# Patient Record
Sex: MALE | Race: WHITE | NOT HISPANIC OR LATINO | Employment: FULL TIME | URBAN - METROPOLITAN AREA
[De-identification: names, ages, dates, MRNs, and addresses within clinical notes are randomized per-mention and may not be internally consistent; named-entity substitution may affect disease eponyms.]

---

## 2022-07-08 ENCOUNTER — APPOINTMENT (OUTPATIENT)
Dept: RADIOLOGY | Facility: CLINIC | Age: 67
End: 2022-07-08
Attending: EMERGENCY MEDICINE
Payer: COMMERCIAL

## 2022-07-08 ENCOUNTER — HOSPITAL ENCOUNTER (EMERGENCY)
Facility: CLINIC | Age: 67
Discharge: HOME | End: 2022-07-08
Attending: EMERGENCY MEDICINE | Admitting: EMERGENCY MEDICINE
Payer: COMMERCIAL

## 2022-07-08 VITALS
DIASTOLIC BLOOD PRESSURE: 69 MMHG | HEART RATE: 100 BPM | BODY MASS INDEX: 29.95 KG/M2 | SYSTOLIC BLOOD PRESSURE: 134 MMHG | OXYGEN SATURATION: 96 % | RESPIRATION RATE: 20 BRPM | HEIGHT: 73 IN | TEMPERATURE: 97.9 F | WEIGHT: 226 LBS

## 2022-07-08 DIAGNOSIS — R55 NEAR SYNCOPE: ICD-10-CM

## 2022-07-08 DIAGNOSIS — G89.18 POSTOPERATIVE PAIN: ICD-10-CM

## 2022-07-08 LAB
ALBUMIN SERPL-MCNC: 3.3 G/DL (ref 3.5–5)
ALP SERPL-CCNC: 54 U/L (ref 45–120)
ALT SERPL W P-5'-P-CCNC: <9 U/L (ref 0–45)
ANION GAP SERPL CALCULATED.3IONS-SCNC: 8 MMOL/L (ref 5–18)
AST SERPL W P-5'-P-CCNC: 17 U/L (ref 0–40)
ATRIAL RATE - MUSE: 91 BPM
BILIRUB SERPL-MCNC: 0.8 MG/DL (ref 0–1)
BNP SERPL-MCNC: 17 PG/ML (ref 0–62)
BUN SERPL-MCNC: 15 MG/DL (ref 8–22)
CALCIUM SERPL-MCNC: 8.7 MG/DL (ref 8.5–10.5)
CHLORIDE BLD-SCNC: 105 MMOL/L (ref 98–107)
CO2 SERPL-SCNC: 25 MMOL/L (ref 22–31)
CREAT SERPL-MCNC: 1.03 MG/DL (ref 0.7–1.3)
DIASTOLIC BLOOD PRESSURE - MUSE: 70 MMHG
ERYTHROCYTE [DISTWIDTH] IN BLOOD BY AUTOMATED COUNT: 13.2 % (ref 10–15)
GFR SERPL CREATININE-BSD FRML MDRD: 80 ML/MIN/1.73M2
GLUCOSE BLD-MCNC: 113 MG/DL (ref 70–125)
HCT VFR BLD AUTO: 32.8 % (ref 40–53)
HGB BLD-MCNC: 10.9 G/DL (ref 13.3–17.7)
INTERPRETATION ECG - MUSE: NORMAL
MCH RBC QN AUTO: 32.8 PG (ref 26.5–33)
MCHC RBC AUTO-ENTMCNC: 33.2 G/DL (ref 31.5–36.5)
MCV RBC AUTO: 99 FL (ref 78–100)
P AXIS - MUSE: 57 DEGREES
PLATELET # BLD AUTO: 202 10E3/UL (ref 150–450)
POTASSIUM BLD-SCNC: 3.6 MMOL/L (ref 3.5–5)
PR INTERVAL - MUSE: 152 MS
PROT SERPL-MCNC: 6.2 G/DL (ref 6–8)
QRS DURATION - MUSE: 64 MS
QT - MUSE: 350 MS
QTC - MUSE: 430 MS
R AXIS - MUSE: 30 DEGREES
RBC # BLD AUTO: 3.32 10E6/UL (ref 4.4–5.9)
SODIUM SERPL-SCNC: 138 MMOL/L (ref 136–145)
SYSTOLIC BLOOD PRESSURE - MUSE: 134 MMHG
T AXIS - MUSE: 55 DEGREES
TROPONIN I SERPL-MCNC: <0.01 NG/ML (ref 0–0.29)
VENTRICULAR RATE- MUSE: 91 BPM
WBC # BLD AUTO: 15.4 10E3/UL (ref 4–11)

## 2022-07-08 PROCEDURE — 71045 X-RAY EXAM CHEST 1 VIEW: CPT

## 2022-07-08 PROCEDURE — 83880 ASSAY OF NATRIURETIC PEPTIDE: CPT | Performed by: EMERGENCY MEDICINE

## 2022-07-08 PROCEDURE — 80053 COMPREHEN METABOLIC PANEL: CPT | Performed by: EMERGENCY MEDICINE

## 2022-07-08 PROCEDURE — 84484 ASSAY OF TROPONIN QUANT: CPT | Performed by: EMERGENCY MEDICINE

## 2022-07-08 PROCEDURE — 93005 ELECTROCARDIOGRAM TRACING: CPT | Performed by: EMERGENCY MEDICINE

## 2022-07-08 PROCEDURE — 99285 EMERGENCY DEPT VISIT HI MDM: CPT | Mod: 25

## 2022-07-08 PROCEDURE — 36415 COLL VENOUS BLD VENIPUNCTURE: CPT | Performed by: EMERGENCY MEDICINE

## 2022-07-08 PROCEDURE — 85027 COMPLETE CBC AUTOMATED: CPT | Performed by: EMERGENCY MEDICINE

## 2022-07-08 PROCEDURE — 250N000013 HC RX MED GY IP 250 OP 250 PS 637: Performed by: EMERGENCY MEDICINE

## 2022-07-08 RX ORDER — OXYCODONE HYDROCHLORIDE 5 MG/1
5 TABLET ORAL ONCE
Status: COMPLETED | OUTPATIENT
Start: 2022-07-08 | End: 2022-07-08

## 2022-07-08 RX ORDER — ACETAMINOPHEN 325 MG/1
650 TABLET ORAL ONCE
Status: COMPLETED | OUTPATIENT
Start: 2022-07-08 | End: 2022-07-08

## 2022-07-08 RX ADMIN — OXYCODONE HYDROCHLORIDE 5 MG: 5 TABLET ORAL at 08:19

## 2022-07-08 RX ADMIN — ACETAMINOPHEN 650 MG: 325 TABLET ORAL at 08:19

## 2022-07-08 NOTE — ED NOTES
Assisted patient in splinting to cough.  Pt states his chest feels heavy, likes its full.  Has had productive cough.  Pt repositioned in bed for comfort.

## 2022-07-08 NOTE — ED NOTES
Patient walked the length of the ED hallway with a walker with no complaints.  He is requesting to have one for home secondary to it being helpful with ambulation.  Gave ordered pain medication.

## 2022-07-08 NOTE — ED PROVIDER NOTES
"EMERGENCY DEPARTMENT ENCOUNTER      NAME: Gustavo Phillips  AGE: 67 year old male  YOB: 1955  MRN: [id number]  EVALUATION DATE & TIME: 7/8/2022  6:30 AM    PCP: No primary care provider on file.    ED PROVIDER: Sharad Vizcarra M.D.      Chief Complaint   Patient presents with     Abdominal Pain     Generalized Weakness         FINAL IMPRESSION:  Near syncope  Factitious hypoxia      ED COURSE & MEDICAL DECISION MAKING:    Pertinent Labs & Imaging studies reviewed. (See chart for details)  67 year old male presents to the Emergency Department for evaluation of abdominal pain.  Patient discharged from [facility] yesterday after da Tiana robotic radical prostatectomy.  Patient did eat yesterday morning and last night.  This morning up attempting to have a bowel movement.  Was straining very little.  Had taken Dulcolax earlier.  Started feeling lightheaded and \"things were closing in\".  Paramedics were called.  He initially somewhat diaphoretic and pale with oxygenation of 87% on room air.  Started on 6 L and transported.  On arrival patient reports feeling improved.  Vital signs normal.  Patient still on oxygen mask.  We will convert this to nasal cannula at 1 to 2 L.  Exam reassuring.  Heart and lungs normal.  Abdomen with multiple superior port sites which are dry without bruising.  Mild suprapubic tenderness.  Minimal bruising to the right anterior proximal thigh.  No lower extremity edema.  Primary concern is of potential pulmonary edema related to aggressive fluids during surgery.  The possibility of erroneous hypoxia due to vagal episode and transient hypotension.  Labs sent for evaluation including BNP.  D-dimer not obtained given recent surgery this will be elevated.  Chest x-ray also been obtained.  EKG obtained and is unremarkable patient appears non toxic with stable vitals signs.     6:32 PM I met with the patient for the initial interview and physical examination. Discussed plan for treatment and " "workup in the ED.    7:23 AM.  Comprehensive metabolic panel normal.  Hemoglobin slightly reduced at 10.9.  White cell count moderately elevated 15.5.  Troponin normal.  BNP normal.  Will discontinue supplemental oxygen and reassess previously noted hypoxia.  Patient may need CTA of the chest.  8:11 AM.  Patient with normal oxygenation on room air.  Patient had been up on commode and had a large bowel movement feels improved.  Initial hypoxia may have been related to vagal episode and low flow state.  Patient will be ambulated here to determine while being prior to deciding disposition.  8:30 AM.  Patient ambulated without difficulties while using a walker.  Does request a walker for home use.  Oxygenation remained excellent.  Patient encouraged to increase fiber in his diet and also to get Metamucil to avoid constipation.  At the conclusion of the encounter I discussed the results of all of the tests and the disposition. The questions were answered and return precautions provided. The patient or family acknowledged understanding and was agreeable with the care plan.       PPE: Provider wore gloves, N95 mask.    MEDICATIONS GIVEN IN THE EMERGENCY:  Medications - No data to display    NEW PRESCRIPTIONS STARTED AT TODAY'S ER VISIT  New Prescriptions    No medications on file          =================================================================    HPI    Patient information was obtained from: patient and EMS    Use of Intrepreter: N/A     Gustavo Phillips is a 67 year old male with a pertient medical history of prostate cancer who presents to the ED for evaluation of abdominal pain, near syncope, shortness of breath.    The patient reports that yesterday (07/07), after being discharged from prostate surgery at [facility], he had developed abdominal pain that he describes \"hurts like hell\". He reports taking dulcolax as he wanted to prevent cramping during bowel movements as he didn't want to strain himself " "while using the bathroom. Around 5 AM this morning his wife had noticed that he was near syncope and called EMS services. Per EMS, patient arrived on 6L NC and had 87% O2 sat on room air. The patient endorses shortness of breath. Patient suspects that symptoms may be due to inability to lay on his side while sleeping (due to catherization). No other complaints at this time.     REVIEW OF SYSTEMS   Constitutional:  Denies fever, chills.  Respiratory:  Denies productive cough. Endorses shortness of breath.   Cardiovascular:  Denies chest pain, palpitations  GI:  Denies nausea, vomiting, or change in bowel or bladder habits. Endorses abdominal pain.  Musculoskeletal:  Denies any new muscle/joint swelling.  Skin:  Denies rash   Neurologic:  Denies focal weakness. Endorses near syncope.  All systems negative except as marked.    PAST MEDICAL HISTORY:  History reviewed. No pertinent past medical history.    PAST SURGICAL HISTORY:  History reviewed. No pertinent surgical history.      CURRENT MEDICATIONS:    No current facility-administered medications for this encounter.  No current outpatient medications on file.    ALLERGIES:  No Known Allergies    FAMILY HISTORY:  History reviewed. No pertinent family history.    SOCIAL HISTORY:   Social History     Socioeconomic History     Marital status:      Spouse name: None     Number of children: None     Years of education: None     Highest education level: None       VITALS:  Patient Vitals for the past 24 hrs:   BP Temp Temp src Pulse Resp SpO2 Height Weight   07/08/22 0730 134/69 -- -- 100 -- 96 % -- --   07/08/22 0700 -- -- -- 88 20 94 % -- --   07/08/22 0645 134/71 -- -- 92 20 93 % -- --   07/08/22 0631 -- 97.9  F (36.6  C) Oral -- -- -- -- --   07/08/22 0626 121/57 -- -- 91 20 90 % 1.854 m (6' 1\") 102.5 kg (226 lb)        PHYSICAL EXAM    Constitutional:  Awake, alert, in mild distress  HENT:  Normocephalic, Atraumatic. Bilateral external ears normal. Oropharynx " moist. Nose normal. Neck- Normal range of motion with no guarding, No midline cervical tenderness, Supple, No stridor.   Eyes:  PERRL, EOMI with no signs of entrapment, Conjunctiva normal, No discharge.   Respiratory: Lung sounds slightly dimmed. No respiratory distress, No wheezing.    Cardiovascular:  Normal heart rate, Normal rhythm, No appreciable rubs or gallops.   GI:  Soft, No tenderness, No distension, No palpable masses. Port sights to abdomen.  Musculoskeletal:  Intact distal pulses, No edema. Good range of motion in all major joints. No tenderness to palpation or major deformities noted.   Integument:  Warm, Dry, No erythema, No rash. Minimal ecchymosis to lower right thigh.  Neurologic:  Alert & oriented, Normal motor function, Normal sensory function, No focal deficits noted.   Psychiatric:  Affect normal, Judgment normal, Mood normal.     LAB:  All pertinent labs reviewed and interpreted.  Results for orders placed or performed during the hospital encounter of 07/08/22   Comprehensive metabolic panel   Result Value Ref Range    Sodium 138 136 - 145 mmol/L    Potassium 3.6 3.5 - 5.0 mmol/L    Chloride 105 98 - 107 mmol/L    Carbon Dioxide (CO2) 25 22 - 31 mmol/L    Anion Gap 8 5 - 18 mmol/L    Urea Nitrogen 15 8 - 22 mg/dL    Creatinine 1.03 0.70 - 1.30 mg/dL    Calcium 8.7 8.5 - 10.5 mg/dL    Glucose 113 70 - 125 mg/dL    Alkaline Phosphatase 54 45 - 120 U/L    AST 17 0 - 40 U/L    ALT <9 0 - 45 U/L    Protein Total 6.2 6.0 - 8.0 g/dL    Albumin 3.3 (L) 3.5 - 5.0 g/dL    Bilirubin Total 0.8 0.0 - 1.0 mg/dL    GFR Estimate 80 >60 mL/min/1.73m2   CBC (+ platelets, no diff)   Result Value Ref Range    WBC Count 15.4 (H) 4.0 - 11.0 10e3/uL    RBC Count 3.32 (L) 4.40 - 5.90 10e6/uL    Hemoglobin 10.9 (L) 13.3 - 17.7 g/dL    Hematocrit 32.8 (L) 40.0 - 53.0 %    MCV 99 78 - 100 fL    MCH 32.8 26.5 - 33.0 pg    MCHC 33.2 31.5 - 36.5 g/dL    RDW 13.2 10.0 - 15.0 %    Platelet Count 202 150 - 450 10e3/uL    B-Type Natriuretic Peptide ([facility] Only)   Result Value Ref Range    BNP 17 0 - 62 pg/mL   Troponin I (now)   Result Value Ref Range    Troponin I <0.01 0.00 - 0.29 ng/mL       RADIOLOGY:  Reviewed all pertinent imaging. Please see official radiology report.  XR Chest Port 1 View    (Results Pending)       EKG:    Normal sinus rhythm.  Q waves anteriorly.  No acute ST segment abnormalities.  No prior tracing.  I have independently reviewed and interpreted the EKG(s) documented above.      I, MARLENY IRBY, am serving as a scribe to document services personally performed by Sharad Vizcarra MD, based on my observation and the provider's statements to me. I, Sharad Vizcarra MD attest that MARLENY IRBY is acting in a scribe capacity, has observed my performance of the services and has documented them in accordance with my direction.    Sharad Vizcarra M.D.  Emergency Medicine  [facility] EMERGENCY ROOM     Sharad Vizcarra MD  07/08/22 3890

## 2022-07-08 NOTE — ED TRIAGE NOTES
Pt here via EMS after feeling lightheaded and dizzy when attempting to have a BM. Pt is 2 days post op prostate surgery and was discharge yesterday. Pt arrived on 6L NC, EMS reports 87% on RA. Pt reporting dizziness, lightheadedness, lower abd pain, and nausea

## 2023-04-23 ENCOUNTER — HOSPITAL ENCOUNTER (EMERGENCY)
Facility: CLINIC | Age: 68
Discharge: HOME | End: 2023-04-24
Attending: EMERGENCY MEDICINE | Admitting: EMERGENCY MEDICINE
Payer: COMMERCIAL

## 2023-04-23 ENCOUNTER — APPOINTMENT (OUTPATIENT)
Dept: RADIOLOGY | Facility: CLINIC | Age: 68
End: 2023-04-23
Attending: FAMILY MEDICINE
Payer: COMMERCIAL

## 2023-04-23 DIAGNOSIS — S93.492A SPRAIN OF ANTERIOR TALOFIBULAR LIGAMENT OF LEFT ANKLE, INITIAL ENCOUNTER: ICD-10-CM

## 2023-04-23 PROCEDURE — 73610 X-RAY EXAM OF ANKLE: CPT | Mod: LT

## 2023-04-23 PROCEDURE — 99283 EMERGENCY DEPT VISIT LOW MDM: CPT

## 2023-04-23 RX ORDER — OXYCODONE HYDROCHLORIDE 5 MG/1
5 TABLET ORAL EVERY 6 HOURS PRN
Qty: 2 TABLET | Refills: 0 | Status: SHIPPED | OUTPATIENT
Start: 2023-04-23

## 2023-04-23 ASSESSMENT — ACTIVITIES OF DAILY LIVING (ADL): ADLS_ACUITY_SCORE: 35

## 2023-04-24 VITALS
OXYGEN SATURATION: 97 % | DIASTOLIC BLOOD PRESSURE: 75 MMHG | HEIGHT: 73 IN | SYSTOLIC BLOOD PRESSURE: 138 MMHG | BODY MASS INDEX: 29.95 KG/M2 | RESPIRATION RATE: 16 BRPM | TEMPERATURE: 98.4 F | WEIGHT: 226 LBS | HEART RATE: 79 BPM

## 2023-04-24 ASSESSMENT — ACTIVITIES OF DAILY LIVING (ADL): ADLS_ACUITY_SCORE: 35

## 2023-04-24 NOTE — DISCHARGE INSTRUCTIONS
Use crutches as needed for support.  Use the splint as needed for support as well.  Elevate while you are sitting, ice on the area.  Alternate Tylenol and ibuprofen for pain control.  Follow-up with [facility] in 1 week if symptoms persist.  Return for any new or worsening symptoms.

## 2023-04-24 NOTE — ED NOTES
Discharge instructions discussed with patient and all questions answered at time of discharge. Patient agreeable with discharge plan of care. VSS. Ambulatory with splint and using crutches at time of discharge.

## 2023-04-24 NOTE — ED TRIAGE NOTES
Pt was walking and rolled his left ankle, C/o pain when walking.     Triage Assessment     Row Name 04/23/23 1637       Triage Assessment (Adult)    Airway WDL WDL       Respiratory WDL    Respiratory WDL WDL       Skin Circulation/Temperature WDL    Skin Circulation/Temperature WDL WDL       Cardiac WDL    Cardiac WDL WDL       Peripheral/Neurovascular WDL    Peripheral Neurovascular WDL WDL    Capillary Refill, General less than/equal to 3 secs       [place] Coma Scale    Best Eye Response 4-->(E4) spontaneous    Best Motor Response 6-->(M6) obeys commands    Best Verbal Response 5-->(V5) oriented    [place] Coma Scale Score 15

## 2023-04-24 NOTE — ED PROVIDER NOTES
EMERGENCY DEPARTMENT ENCOUNTER      NAME: Gustavo Phillips  AGE: 67 year old male  YOB: 1955  MRN: [id number]  EVALUATION DATE & TIME: 4/23/2023 10:55 PM    PCP: Lynn Winters    ED PROVIDER: Lucy Avina M.D.      Chief Complaint   Patient presents with     Ankle Pain         FINAL IMPRESSION:  1. Sprain of anterior talofibular ligament of left ankle, initial encounter        MEDICAL DECISION MAKING:    Pertinent Labs & Imaging studies reviewed. (See chart for details)  ED Course as of 04/23/23 2332   Sun Apr 23, 2023   2300 Afebrile.  Vital signs here unremarkable.  Patient coming in with left ankle pain.  Lay down and was fell asleep watching the game.  His leg fell asleep and then he got up and walked and inverted his ankle.  Did not fall to the ground.  Did not hit his head.  No other issue.  Pain laterally on his foot and ankle.    Exam for patient here with some swelling noted over his ACL ligament.  No significant lateral or posterior or medial malleoli or tenderness.  No tenderness over the base of the fifth metatarsal.  No tenderness at the fibular head and no tenderness up in the calf.  Appropriate pulses.  Able to move his toes.  Appropriate sensation.   2303 Awaiting x-ray.     X-ray here without acute fracture.  Discharged with splint and crutches.  Return for any new or worsening symptoms.    Medical Decision Making    History:    Supplemental history from: Documented in chart, if applicable    External Record(s) reviewed: Documented in chart, if applicable.    Work Up:    Chart documentation includes differential considered and any EKGs or imaging independently interpreted by provider, where specified.    In additional to work up documented, I considered the following work up: Documented in chart, if applicable.    External consultation:    Discussion of management with another provider: Documented in chart, if applicable    Complicating factors:    Care impacted by chronic  illness: N/A    Care affected by social determinants of health: N/A    Disposition considerations: Discharge. I prescribed additional prescription strength medication(s) as charted. See documentation for any additional details.          Critical care: 0 minutes excluding separately billable procedures.  Includes bedside management, time reviewing test results, review of records, discussing the case with staff, documenting the medical record and time spent with family members (or surrogate decision makers) discussing specific treatment issues.          ED COURSE:  10:58 PM I met with the patient to gather history and perform my exam. ED course and treatment discussed.     The importance of close follow up was discussed. We reviewed warning signs and symptoms, and I instructed Mr. Phillips to return to the emergency department immediately if he develops any new or worsening symptoms. I provided additional verbal discharge instructions. Mr. Phillips expressed understanding and agreement with this plan of care, his questions were answered, and he was discharged in stable condition.     MEDICATIONS GIVEN IN THE EMERGENCY:  Medications - No data to display    NEW PRESCRIPTIONS STARTED AT TODAY'S ER VISIT:  New Prescriptions    OXYCODONE (ROXICODONE) 5 MG TABLET    Take 1 tablet (5 mg) by mouth every 6 hours as needed for pain          =================================================================    HPI    Patient information was obtained from: Patient    Use of : N/A       Gustavo Phillips is a 67 year old male who presents to the ED by walk in for evaluation of ankle pain.    Patient reports that he was laying down when his left leg fell asleep. When the patient got up he says that he inverted his left ankle. He now complains of pain laterally on his foot and ankle. Patient denies falling to the ground, any head trauma, any other injuries, or any other concerns at this time.    REVIEW OF SYSTEMS  "  Review of Systems   Musculoskeletal:        Positive for left ankle pain. Negative for head trauma or any other injuries.   All other systems reviewed and are negative.    PAST MEDICAL HISTORY:  History reviewed. No pertinent past medical history.    PAST SURGICAL HISTORY:  History reviewed. No pertinent surgical history.    CURRENT MEDICATIONS:    No current facility-administered medications for this encounter.    Current Outpatient Medications:      oxyCODONE (ROXICODONE) 5 MG tablet, Take 1 tablet (5 mg) by mouth every 6 hours as needed for pain, Disp: 2 tablet, Rfl: 0    ALLERGIES:  No Known Allergies    FAMILY HISTORY:  No family history on file.    SOCIAL HISTORY:   Social History     Socioeconomic History     Marital status:      Spouse name: None     Number of children: None     Years of education: None     Highest education level: None       PHYSICAL EXAM:    Vitals: BP (!) 149/84   Pulse 86   Temp 98.4  F (36.9  C) (Temporal)   Resp 16   Ht 1.854 m (6' 1\")   Wt 102.5 kg (226 lb)   SpO2 97%   BMI 29.82 kg/m     General:. Alert and interactive, comfortable appearing.  HENT: Oropharynx without erythema or exudates. MMM.  TMs clear bilaterally.  Eyes: Pupils mid-sized and equally reactive.   Neck: Full AROM.  No midline tenderness to palpation.  Cardiovascular: Regular rate and rhythm. Peripheral pulses 2+ bilaterally.  Chest/Pulmonary: Normal work of breathing. Lung sounds clear and equal throughout, no wheezes or crackles. No chest wall tenderness or deformities.  Abdomen: Soft, nondistended. Nontender without guarding or rebound.  Extremities: Normal ROM of all major joints. No lower extremity edema. Swelling noted over his left ACL ligament. No significant lateral, posterior, or medial malleoli tenderness. No tenderness over the base of the fifth metatarsal. No tenderness at the fibular head and no tenderness up the calf. Appropriate pulses. Able to move his toes. Appropriate " sensation.  Skin: Warm and dry. Normal skin color.   Neuro: Speech clear. CNs grossly intact. Moves all extremities appropriately. Strength and sensation grossly intact to all extremities.   Psych: Normal affect/mood, cooperative, memory appropriate.     LAB:  All pertinent labs reviewed and interpreted.  Labs Ordered and Resulted from Time of ED Arrival to Time of ED Departure - No data to display    RADIOLOGY:  XR Ankle Left G/E 3 Views   Final Result   IMPRESSION: Anatomic alignment of the left ankle. No acute fracture or dislocation. Ankle mortise is symmetric. Talar dome is smooth. Minor soft tissue swelling about the left. Small plantar calcaneal and Achilles heel spurs.          PROCEDURES:         I, Annabelle Draper, am serving as a scribe to document services personally performed by Dr. Lucy Avina  based on my observation and the provider's statements to me. I, Lucy Avina MD attest that Annabelle Draper is acting in a scribe capacity, has observed my performance of the services and has documented them in accordance with my direction.      Lucy Avina M.D.  Emergency Medicine  [facility] EMERGENCY ROOM  [address] [facility] [id number] [phone]  Dept: [phone]     Lucy Avina MD  04/23/23 5884

## 2024-06-17 ENCOUNTER — HOSPITAL ENCOUNTER (EMERGENCY)
Facility: CLINIC | Age: 69
Discharge: HOME | End: 2024-06-18
Attending: EMERGENCY MEDICINE | Admitting: EMERGENCY MEDICINE
Payer: COMMERCIAL

## 2024-06-17 DIAGNOSIS — E86.0 MILD DEHYDRATION: ICD-10-CM

## 2024-06-17 DIAGNOSIS — R11.2 NAUSEA AND VOMITING, UNSPECIFIED VOMITING TYPE: ICD-10-CM

## 2024-06-17 LAB
ATRIAL RATE - MUSE: 88 BPM
DIASTOLIC BLOOD PRESSURE - MUSE: NORMAL MMHG
INTERPRETATION ECG - MUSE: NORMAL
P AXIS - MUSE: 62 DEGREES
PR INTERVAL - MUSE: 156 MS
QRS DURATION - MUSE: 82 MS
QT - MUSE: 372 MS
QTC - MUSE: 450 MS
R AXIS - MUSE: 57 DEGREES
SYSTOLIC BLOOD PRESSURE - MUSE: NORMAL MMHG
T AXIS - MUSE: 57 DEGREES
VENTRICULAR RATE- MUSE: 88 BPM

## 2024-06-17 PROCEDURE — 96374 THER/PROPH/DIAG INJ IV PUSH: CPT | Mod: 59

## 2024-06-17 PROCEDURE — 96361 HYDRATE IV INFUSION ADD-ON: CPT

## 2024-06-17 PROCEDURE — 99284 EMERGENCY DEPT VISIT MOD MDM: CPT | Mod: 25

## 2024-06-17 PROCEDURE — 93005 ELECTROCARDIOGRAM TRACING: CPT | Performed by: EMERGENCY MEDICINE

## 2024-06-17 RX ORDER — ONDANSETRON 2 MG/ML
4 INJECTION INTRAMUSCULAR; INTRAVENOUS EVERY 30 MIN PRN
Status: DISCONTINUED | OUTPATIENT
Start: 2024-06-17 | End: 2024-06-18 | Stop reason: HOSPADM

## 2024-06-17 ASSESSMENT — ENCOUNTER SYMPTOMS
DYSURIA: 0
ABDOMINAL PAIN: 0
NAUSEA: 1
CHILLS: 1
FREQUENCY: 0
FLANK PAIN: 0
VOMITING: 1
DIFFICULTY URINATING: 0
HEMATURIA: 0

## 2024-06-17 ASSESSMENT — COLUMBIA-SUICIDE SEVERITY RATING SCALE - C-SSRS
6. HAVE YOU EVER DONE ANYTHING, STARTED TO DO ANYTHING, OR PREPARED TO DO ANYTHING TO END YOUR LIFE?: NO
1. IN THE PAST MONTH, HAVE YOU WISHED YOU WERE DEAD OR WISHED YOU COULD GO TO SLEEP AND NOT WAKE UP?: NO
2. HAVE YOU ACTUALLY HAD ANY THOUGHTS OF KILLING YOURSELF IN THE PAST MONTH?: NO

## 2024-06-18 VITALS
HEIGHT: 73 IN | BODY MASS INDEX: 30.09 KG/M2 | TEMPERATURE: 98.2 F | SYSTOLIC BLOOD PRESSURE: 143 MMHG | HEART RATE: 80 BPM | WEIGHT: 227 LBS | DIASTOLIC BLOOD PRESSURE: 79 MMHG | OXYGEN SATURATION: 98 % | RESPIRATION RATE: 18 BRPM

## 2024-06-18 LAB
ALBUMIN SERPL BCG-MCNC: 4.5 G/DL (ref 3.5–5.2)
ALP SERPL-CCNC: 82 U/L (ref 40–150)
ALT SERPL W P-5'-P-CCNC: 11 U/L (ref 0–70)
ANION GAP SERPL CALCULATED.3IONS-SCNC: 11 MMOL/L (ref 7–15)
AST SERPL W P-5'-P-CCNC: 22 U/L (ref 0–45)
BASOPHILS # BLD AUTO: 0.1 10E3/UL (ref 0–0.2)
BASOPHILS NFR BLD AUTO: 0 %
BILIRUB SERPL-MCNC: 0.7 MG/DL
BUN SERPL-MCNC: 22.5 MG/DL (ref 8–23)
CALCIUM SERPL-MCNC: 9.4 MG/DL (ref 8.8–10.2)
CHLORIDE SERPL-SCNC: 104 MMOL/L (ref 98–107)
CREAT SERPL-MCNC: 0.99 MG/DL (ref 0.67–1.17)
DEPRECATED HCO3 PLAS-SCNC: 25 MMOL/L (ref 22–29)
EGFRCR SERPLBLD CKD-EPI 2021: 82 ML/MIN/1.73M2
EOSINOPHIL # BLD AUTO: 0.2 10E3/UL (ref 0–0.7)
EOSINOPHIL NFR BLD AUTO: 1 %
ERYTHROCYTE [DISTWIDTH] IN BLOOD BY AUTOMATED COUNT: 13.2 % (ref 10–15)
FLUAV RNA SPEC QL NAA+PROBE: NEGATIVE
FLUBV RNA RESP QL NAA+PROBE: NEGATIVE
GLUCOSE SERPL-MCNC: 117 MG/DL (ref 70–99)
HCT VFR BLD AUTO: 48.1 % (ref 40–53)
HGB BLD-MCNC: 16.5 G/DL (ref 13.3–17.7)
IMM GRANULOCYTES # BLD: 0.1 10E3/UL
IMM GRANULOCYTES NFR BLD: 0 %
LIPASE SERPL-CCNC: 91 U/L (ref 13–60)
LYMPHOCYTES # BLD AUTO: 0.7 10E3/UL (ref 0.8–5.3)
LYMPHOCYTES NFR BLD AUTO: 4 %
MAGNESIUM SERPL-MCNC: 1.9 MG/DL (ref 1.7–2.3)
MCH RBC QN AUTO: 32.4 PG (ref 26.5–33)
MCHC RBC AUTO-ENTMCNC: 34.3 G/DL (ref 31.5–36.5)
MCV RBC AUTO: 95 FL (ref 78–100)
MONOCYTES # BLD AUTO: 0.5 10E3/UL (ref 0–1.3)
MONOCYTES NFR BLD AUTO: 3 %
NEUTROPHILS # BLD AUTO: 14.7 10E3/UL (ref 1.6–8.3)
NEUTROPHILS NFR BLD AUTO: 91 %
NRBC # BLD AUTO: 0 10E3/UL
NRBC BLD AUTO-RTO: 0 /100
PLATELET # BLD AUTO: 262 10E3/UL (ref 150–450)
POTASSIUM SERPL-SCNC: 4.2 MMOL/L (ref 3.4–5.3)
PROT SERPL-MCNC: 7.5 G/DL (ref 6.4–8.3)
RBC # BLD AUTO: 5.09 10E6/UL (ref 4.4–5.9)
RSV RNA SPEC NAA+PROBE: NEGATIVE
SARS-COV-2 RNA RESP QL NAA+PROBE: NEGATIVE
SODIUM SERPL-SCNC: 140 MMOL/L (ref 135–145)
TROPONIN T SERPL HS-MCNC: 7 NG/L
WBC # BLD AUTO: 16.2 10E3/UL (ref 4–11)

## 2024-06-18 PROCEDURE — 96374 THER/PROPH/DIAG INJ IV PUSH: CPT | Mod: 59

## 2024-06-18 PROCEDURE — 85025 COMPLETE CBC W/AUTO DIFF WBC: CPT | Performed by: EMERGENCY MEDICINE

## 2024-06-18 PROCEDURE — 258N000003 HC RX IP 258 OP 636: Mod: JZ | Performed by: EMERGENCY MEDICINE

## 2024-06-18 PROCEDURE — 87637 SARSCOV2&INF A&B&RSV AMP PRB: CPT | Performed by: EMERGENCY MEDICINE

## 2024-06-18 PROCEDURE — 83690 ASSAY OF LIPASE: CPT | Performed by: EMERGENCY MEDICINE

## 2024-06-18 PROCEDURE — 36415 COLL VENOUS BLD VENIPUNCTURE: CPT | Performed by: EMERGENCY MEDICINE

## 2024-06-18 PROCEDURE — 96361 HYDRATE IV INFUSION ADD-ON: CPT

## 2024-06-18 PROCEDURE — 83735 ASSAY OF MAGNESIUM: CPT | Performed by: EMERGENCY MEDICINE

## 2024-06-18 PROCEDURE — 84484 ASSAY OF TROPONIN QUANT: CPT | Performed by: EMERGENCY MEDICINE

## 2024-06-18 PROCEDURE — 250N000011 HC RX IP 250 OP 636: Mod: JZ | Performed by: EMERGENCY MEDICINE

## 2024-06-18 PROCEDURE — 80053 COMPREHEN METABOLIC PANEL: CPT | Performed by: EMERGENCY MEDICINE

## 2024-06-18 RX ORDER — ONDANSETRON 4 MG/1
4 TABLET, ORALLY DISINTEGRATING ORAL EVERY 8 HOURS PRN
Qty: 10 TABLET | Refills: 0 | Status: SHIPPED | OUTPATIENT
Start: 2024-06-18 | End: 2024-06-21

## 2024-06-18 RX ADMIN — SODIUM CHLORIDE 1000 ML: 9 INJECTION, SOLUTION INTRAVENOUS at 00:04

## 2024-06-18 RX ADMIN — ONDANSETRON 4 MG: 2 INJECTION INTRAMUSCULAR; INTRAVENOUS at 00:05

## 2024-06-18 ASSESSMENT — ACTIVITIES OF DAILY LIVING (ADL): ADLS_ACUITY_SCORE: 35

## 2024-06-18 NOTE — ED PROVIDER NOTES
NAME: Gustavo Phillips  AGE: 69 year old male  YOB: 1955  MRN: [id number]  EVALUATION DATE & TIME: 2024 11:35 PM    PCP: Lynn Winters    ED PROVIDER: Michael Castorena M.D.      Chief Complaint   Patient presents with    Nausea & Vomiting         FINAL IMPRESSION:  1. Nausea and vomiting, unspecified vomiting type    2. Mild dehydration        MEDICAL DECISION MAKIN:45 PM Patient was clinically assessed and consented to treatment. After assessment, medical decision making and workup were discussed with the patient. The patient was agreeable to plan for testing, workup, and treatment.  Pertinent Labs & Imaging studies reviewed. (See chart for details)  12:43 AM I updated the patient on their results.  1:37 AM Patient able to drink cup of water without complications.  1:45 AM I discussed discharge and the patient is agreeable. Reviewed supportive cares, symptomatic treatment, outpatient follow up, and reasons to return to the Emergency Department. All questions and concerns were addressed. Patient to be discharged by ED RN.        Medical Decision Making  Obtained supplemental history:Supplemental history obtained?: No  Reviewed external records: External records reviewed?: Documented in chart  Care impacted by chronic illness:N/A  Care significantly affected by social determinants of health:Access to Medical Care  Did you consider but not order tests?: Work up considered but not performed and documented in chart, if applicable  Did you interpret images independently?: Independent interpretation of ECG and images noted in documentation, when applicable.  Consultation discussion with other provider:Did you involve another provider (consultant, MH, pharmacy, etc.)?: No  Discharge. I prescribed additional prescription strength medication(s) as charted. See documentation for any additional details.    Gustavo Phillips is a 69 year old male who presents with nausea and vomiting.    Differential diagnosis includes but not limited to gastroenteritis, gastritis, colitis, pancreatitis, biliary colic, appendicitis.  Patient in usual state of health however woke with some ill feeling such as aches and intermittent nausea yesterday morning.  Patient however started having nausea and vomiting throughout the evening and came in with dry heaving.  Patient does appear quite uncomfortable and given the abnormal symptoms we will check labs as well as EKG.  EKG did not reveal any acute ischemia and labs returned showing leukocytosis, however LFTs and metabolic panel otherwise unremarkable except for slightly elevated lipase which likely related to concentration and the vomiting.  Patient had no abdominal pain and was feeling better after fluids and Zofran.  I did recheck on patient and reexamined him and with no further tenderness in the abdomen as well as viral studies negative patient feeling well off to try oral challenge.  Patient doing well after oral challenge and following discussion would plan for discharge home with Zofran and follow-up and return instructions.    0 minutes of critical care time    MEDICATIONS GIVEN IN THE EMERGENCY:  Medications   sodium chloride 0.9% BOLUS 1,000 mL (0 mLs Intravenous Stopped 6/18/24 0105)       NEW PRESCRIPTIONS STARTED AT TODAY'S ER VISIT:  Discharge Medication List as of 6/18/2024  1:42 AM        START taking these medications    Details   ondansetron (ZOFRAN ODT) 4 MG ODT tab Take 1 tablet (4 mg) by mouth every 8 hours as needed for nausea, Disp-10 tablet, R-0, Local Print                =================================================================    [facility]    Patient information was obtained from: The patient    Use of : N/A         Gustavo Phillips is a 69 year old male with no past medical history, who presents with nausea and vomiting.    The patient reports he was feeling ill and tired since Saturday (2 days ago). He had diarrhea on  Saturday, but resolved by the next day. Tonight around 6 PM, he started feeling chills, nauseous and had a few nonbloody vomiting episodes. He endorses feeling light-headed tonight while getting up from the chair in triage. He had surgery for prostate cancer 2 years ago and has been doing well since. No other significant medical history.    Otherwise, the patient denied having abdominal pain, flank pain, cough, cold symptoms, urinary symptoms, and any other medical complaints at this time.        REVIEW OF SYSTEMS   Review of Systems   Constitutional:  Positive for chills.   Gastrointestinal:  Positive for nausea and vomiting. Negative for abdominal pain.   Genitourinary:  Negative for difficulty urinating, dysuria, flank pain, frequency, hematuria and urgency.   All other systems reviewed and are negative.       PAST MEDICAL HISTORY:  No past medical history on file.    PAST SURGICAL HISTORY:  No past surgical history on file.    CURRENT MEDICATIONS:    No current facility-administered medications for this encounter.    Current Outpatient Medications:     ondansetron (ZOFRAN ODT) 4 MG ODT tab, Take 1 tablet (4 mg) by mouth every 8 hours as needed for nausea, Disp: 10 tablet, Rfl: 0    oxyCODONE (ROXICODONE) 5 MG tablet, Take 1 tablet (5 mg) by mouth every 6 hours as needed for pain, Disp: 2 tablet, Rfl: 0    ALLERGIES:  No Known Allergies    FAMILY HISTORY:  No family history on file.    SOCIAL HISTORY:   Social History     Socioeconomic History    Marital status:      Social Determinants of Health     Financial Resource Strain: Low Risk  (3/14/2022)    Received from [facility]    Overall Financial Resource Strain (CARDIA)     Difficulty of Paying Living Expenses: Not very hard   Food Insecurity: No Food Insecurity (3/14/2022)    Received from [facility]    Hunger Vital Sign     Worried About Running Out of Food in the Last Year: Never true     Ran Out of Food in the Last Year: Never true   Transportation  "Needs: No Transportation Needs (3/14/2022)    Received from [facility]    PRAPARE - Transportation     Lack of Transportation (Medical): No     Lack of Transportation (Non-Medical): No   Physical Activity: Insufficiently Active (3/14/2022)    Received from [facility]    Exercise Vital Sign     Days of Exercise per Week: 3 days     Minutes of Exercise per Session: 30 min   Stress: No Stress Concern Present (3/14/2022)    Received from [facility]    [employer] [place] [employer] - Occupational Stress Questionnaire     Feeling of Stress : Only a little   Social Connections: Moderately Isolated (3/14/2022)    Received from [facility]    Social Connection and Isolation Panel [NHANES]     Frequency of Communication with Friends and Family: Once a week     Frequency of Social Gatherings with Friends and Family: Once a week     Attends Yazidism Services: 1 to 4 times per year     Active Member of Clubs or Organizations: No     Attends Club or Organization Meetings: Patient declined     Marital Status:    Interpersonal Safety: Not At Risk (3/14/2022)    Received from [facility]    Humiliation, Afraid, Rape, and Kick questionnaire     Fear of Current or Ex-Partner: No     Emotionally Abused: No     Physically Abused: No     Sexually Abused: No   Housing Stability: Unknown (3/14/2022)    Received from [facility]    Housing Stability Vital Sign     Unable to Pay for Housing in the Last Year: Patient refused     Number of Places Lived in the Last Year: 1     Unstable Housing in the Last Year: No       PHYSICAL EXAM:    Vitals: BP (!) 143/79   Pulse 80   Temp 98.2  F (36.8  C) (Oral)   Resp 18   Ht 1.854 m (6' 1\")   Wt 103 kg (227 lb)   SpO2 98%   BMI 29.95 kg/m     Physical Exam  Vitals and nursing note reviewed.   Constitutional:       General: He is not in acute distress.     Appearance: Normal appearance. He is normal weight. He is not ill-appearing or toxic-appearing.   HENT:      Head: " Normocephalic.      Mouth/Throat:      Mouth: Mucous membranes are dry.   Eyes:      General: No scleral icterus.     Conjunctiva/sclera: Conjunctivae normal.   Cardiovascular:      Rate and Rhythm: Normal rate and regular rhythm.      Heart sounds: Normal heart sounds.   Pulmonary:      Effort: Pulmonary effort is normal. No respiratory distress.      Breath sounds: Normal breath sounds.   Abdominal:      General: There is no distension.      Palpations: Abdomen is soft.      Tenderness: There is no abdominal tenderness. There is no right CVA tenderness, left CVA tenderness, guarding or rebound.   Musculoskeletal:      Cervical back: Normal range of motion.      Right lower leg: No edema.      Left lower leg: No edema.   Skin:     General: Skin is warm and dry.      Coloration: Skin is not jaundiced or pale.   Neurological:      General: No focal deficit present.      Mental Status: He is alert.   Psychiatric:         Behavior: Behavior normal.           LAB:  All pertinent labs reviewed and interpreted.  Labs Ordered and Resulted from Time of ED Arrival to Time of ED Departure   COMPREHENSIVE METABOLIC PANEL - Abnormal       Result Value    Sodium 140      Potassium 4.2      Carbon Dioxide (CO2) 25      Anion Gap 11      Urea Nitrogen 22.5      Creatinine 0.99      GFR Estimate 82      Calcium 9.4      Chloride 104      Glucose 117 (*)     Alkaline Phosphatase 82      AST 22      ALT 11      Protein Total 7.5      Albumin 4.5      Bilirubin Total 0.7     LIPASE - Abnormal    Lipase 91 (*)    CBC WITH PLATELETS AND DIFFERENTIAL - Abnormal    WBC Count 16.2 (*)     RBC Count 5.09      Hemoglobin 16.5      Hematocrit 48.1      MCV 95      MCH 32.4      MCHC 34.3      RDW 13.2      Platelet Count 262      % Neutrophils 91      % Lymphocytes 4      % Monocytes 3      % Eosinophils 1      % Basophils 0      % Immature Granulocytes 0      NRBCs per 100 WBC 0      Absolute Neutrophils 14.7 (*)     Absolute Lymphocytes 0.7  (*)     Absolute Monocytes 0.5      Absolute Eosinophils 0.2      Absolute Basophils 0.1      Absolute Immature Granulocytes 0.1      Absolute NRBCs 0.0     TROPONIN T, HIGH SENSITIVITY - Normal    Troponin T, High Sensitivity 7     MAGNESIUM - Normal    Magnesium 1.9     INFLUENZA A/B, RSV, & SARS-COV2 PCR - Normal    Influenza A PCR Negative      Influenza B PCR Negative      RSV PCR Negative      SARS CoV2 PCR Negative         RADIOLOGY:  No orders to display       EKG:   Performed at: 06/17/2024, 23:53  Impression: Sinus rhythm, normal EKG, no signs of acute ST elevation ischemia, no evidence of ectopy or atrial fibrillation.  Rate: 88 bpm  Rhythm: Sinus rhythm  QRS Interval: 82 ms  QTc Interval: 450 ms  Comparison: Compared with ECG of 07/08/2022, 06:32.  I have independently reviewed and interpreted the EKG(s) documented above.     PROCEDURES:   Procedures       I, Michael Gallegos, am serving as a scribe to document services personally performed by Dr. Michael Castorena  based on my observation and the provider's statements to me. I, Michael Castorena MD attest that Michael Gallegos is acting in a scribe capacity, has observed my performance of the services and has documented them in accordance with my direction.      Michael Castorena M.D.  Emergency Medicine  [facility] Emergency Department       Michael Castorena MD  06/18/24 0621

## 2024-06-18 NOTE — ED TRIAGE NOTES
Patient presents to the ED complaining of feeling sick the past couple days.  He states he started having nausea and vomiting this evening and feeling chilled.  No fevers reported.       Triage Assessment (Adult)       Row Name 06/17/24 1779          Triage Assessment    Airway WDL WDL        Respiratory WDL    Respiratory WDL WDL        Skin Circulation/Temperature WDL    Skin Circulation/Temperature WDL WDL        Cardiac WDL    Cardiac WDL X;rhythm     Pulse Rate & Regularity tachycardic        Peripheral/Neurovascular WDL    Peripheral Neurovascular WDL WDL        Cognitive/Neuro/Behavioral WDL    Cognitive/Neuro/Behavioral WDL WDL

## 2024-11-20 LAB
ALBUMIN UR-MCNC: NEGATIVE MG/DL
APPEARANCE UR: CLEAR
BILIRUB UR QL STRIP: NEGATIVE
COLOR UR AUTO: ABNORMAL
GLUCOSE UR STRIP-MCNC: NEGATIVE MG/DL
HGB UR QL STRIP: ABNORMAL
KETONES UR STRIP-MCNC: NEGATIVE MG/DL
LEUKOCYTE ESTERASE UR QL STRIP: NEGATIVE
MUCOUS THREADS #/AREA URNS LPF: PRESENT /LPF
NITRATE UR QL: NEGATIVE
PH UR STRIP: 7 [PH] (ref 5–7)
RBC URINE: 37 /HPF
SP GR UR STRIP: 1.01 (ref 1–1.03)
UROBILINOGEN UR STRIP-MCNC: <2 MG/DL
WBC URINE: 1 /HPF

## 2024-11-20 PROCEDURE — 99284 EMERGENCY DEPT VISIT MOD MDM: CPT | Mod: 25

## 2024-11-20 PROCEDURE — 81001 URINALYSIS AUTO W/SCOPE: CPT | Performed by: EMERGENCY MEDICINE

## 2024-11-20 ASSESSMENT — COLUMBIA-SUICIDE SEVERITY RATING SCALE - C-SSRS
1. IN THE PAST MONTH, HAVE YOU WISHED YOU WERE DEAD OR WISHED YOU COULD GO TO SLEEP AND NOT WAKE UP?: NO
2. HAVE YOU ACTUALLY HAD ANY THOUGHTS OF KILLING YOURSELF IN THE PAST MONTH?: NO
6. HAVE YOU EVER DONE ANYTHING, STARTED TO DO ANYTHING, OR PREPARED TO DO ANYTHING TO END YOUR LIFE?: NO

## 2024-11-21 ENCOUNTER — HOSPITAL ENCOUNTER (EMERGENCY)
Facility: CLINIC | Age: 69
Discharge: HOME | End: 2024-11-21
Attending: STUDENT IN AN ORGANIZED HEALTH CARE EDUCATION/TRAINING PROGRAM | Admitting: STUDENT IN AN ORGANIZED HEALTH CARE EDUCATION/TRAINING PROGRAM
Payer: COMMERCIAL

## 2024-11-21 VITALS
BODY MASS INDEX: 29.55 KG/M2 | HEART RATE: 58 BPM | DIASTOLIC BLOOD PRESSURE: 80 MMHG | WEIGHT: 223 LBS | TEMPERATURE: 97.3 F | SYSTOLIC BLOOD PRESSURE: 159 MMHG | HEIGHT: 73 IN | RESPIRATION RATE: 18 BRPM | OXYGEN SATURATION: 98 %

## 2024-11-21 DIAGNOSIS — N45.1 EPIDIDYMITIS: ICD-10-CM

## 2024-11-21 PROBLEM — E66.09 CLASS 1 OBESITY DUE TO EXCESS CALORIES WITHOUT SERIOUS COMORBIDITY IN ADULT: Status: ACTIVE | Noted: 2019-07-05

## 2024-11-21 PROBLEM — E66.811 CLASS 1 OBESITY DUE TO EXCESS CALORIES WITHOUT SERIOUS COMORBIDITY IN ADULT: Status: ACTIVE | Noted: 2019-07-05

## 2024-11-21 PROBLEM — F32.A DEPRESSIVE DISORDER: Status: ACTIVE | Noted: 2020-01-01

## 2024-11-21 PROBLEM — C61 PRIMARY MALIGNANT NEOPLASM OF PROSTATE (H): Status: ACTIVE | Noted: 2022-06-28

## 2024-11-21 PROCEDURE — 250N000013 HC RX MED GY IP 250 OP 250 PS 637: Performed by: STUDENT IN AN ORGANIZED HEALTH CARE EDUCATION/TRAINING PROGRAM

## 2024-11-21 RX ORDER — LEVOFLOXACIN 500 MG/1
500 TABLET, FILM COATED ORAL ONCE
Status: COMPLETED | OUTPATIENT
Start: 2024-11-21 | End: 2024-11-21

## 2024-11-21 RX ORDER — OXYCODONE HYDROCHLORIDE 5 MG/1
5 TABLET ORAL EVERY 6 HOURS PRN
Qty: 4 TABLET | Refills: 0 | Status: SHIPPED | OUTPATIENT
Start: 2024-11-21 | End: 2024-11-22

## 2024-11-21 RX ORDER — LEVOFLOXACIN 500 MG/1
500 TABLET, FILM COATED ORAL DAILY
Qty: 10 TABLET | Refills: 0 | Status: SHIPPED | OUTPATIENT
Start: 2024-11-22 | End: 2024-12-02

## 2024-11-21 RX ADMIN — LEVOFLOXACIN 500 MG: 500 TABLET, FILM COATED ORAL at 00:35

## 2024-11-21 NOTE — ED TRIAGE NOTES
Patient arrives to the ER with left testicle pain that started about 2000 this evening.  Rates the pain at a 2/10, was worse when the pain initially started.      Triage Assessment (Adult)       Row Name 11/20/24 9588          Triage Assessment    Airway WDL WDL        Respiratory WDL    Respiratory WDL WDL        Skin Circulation/Temperature WDL    Skin Circulation/Temperature WDL X  L testicle swelling        Cardiac WDL    Cardiac WDL WDL

## 2024-11-21 NOTE — ED PROVIDER NOTES
EMERGENCY DEPARTMENT ENCOUNTER      NAME: Gustavo Phillips  AGE: 69 year old male  YOB: 1955  MRN: [id number]  EVALUATION DATE & TIME: No admission date for patient encounter.    PCP: Lynn Winters    ED PROVIDER: Jordan Miguel MD      Chief Complaint   Patient presents with    Groin Swelling         FINAL IMPRESSION:  1. Epididymitis          ED COURSE & MEDICAL DECISION MAKING:    Pertinent Labs & Imaging studies reviewed. (See chart for details)  69 year old male presents to the Emergency Department for evaluation of testicular pain    ED Course as of 11/21/24 0504   Thu Nov 21, 2024   0030 Patient is a 69-year-old male with history of prostate cancer presenting to the emergency department for evaluation of left-sided testicular pain/swelling started this evening around 8 PM or so.  Patient noted some aching/pain in his left testicle and felt like his little bit swollen on the left side compared to the right.  Denies any associated abdominal pain nausea or vomiting.  No fevers back or flank pain.   Denies urinary symptoms.  No diarrhea.    Exam patient is well-appearing in no acute distress.  Hemodynamically stable and afebrile.  Saturating well on room air.  Abdomen soft benign.  No flank or CVA tenderness.   testes descended bilaterally and testicles himself seem normal in size but does seem to have some epididymal swelling on the left side with tenderness palpation of this region, no palpable inguinal hernia.    Ultrasound shows a large heterogenous hyperemic left at the epididymis consistent with epididymitis.  No signs of torsion.    Findings discussed with the patient and he does not have any known allergies to antibiotics.  Will start him on levofloxacin for 10 days.  First dose in the emergency department.  Recommend Tylenol and ibuprofen for pain relief will give him a few doses of oxycodone for breakthrough pain.  Otherwise recommend routine follow-up with his primary care  doctor/urologist and if he begins to have fevers, recurrent vomiting and inability to tolerate oral medication or other concerning symptoms should return to the emergency department for repeat evaluation     12:00 AM I met and evaluated the patient.   12:25 AM We discussed the plan for discharge and the patient is agreeable. Reviewed supportive cares, symptomatic treatment, outpatient follow up, and reasons to return to the Emergency Department. All questions and concerns were addressed. Patient to be discharged by ED RN.       Medical Decision Making  Obtained supplemental history:Supplemental history obtained?: No  Reviewed external records: External records reviewed?: Documented in chart  Care impacted by chronic illness:Smoking / Nicotine Use  Care significantly affected by social determinants of health:N/A  Did you consider but not order tests?: Work up considered but not performed and documented in chart, if applicable  Did you interpret images independently?: Independent interpretation of ECG and images noted in documentation, when applicable.  Consultation discussion with other provider:Did you involve another provider (consultant, MH, pharmacy, etc.)?: No  Discharge. I prescribed additional prescription strength medication(s) as charted. See documentation for any additional details.  Not Applicable          At the conclusion of the encounter I discussed the results of all of the tests and the disposition. The questions were answered. The patient or family acknowledged understanding and was agreeable with the care plan.     0 minutes of critical care time     MEDICATIONS GIVEN IN THE EMERGENCY:  Medications   levofloxacin (LEVAQUIN) tablet 500 mg (500 mg Oral $Given 11/21/24 0035)       NEW PRESCRIPTIONS STARTED AT TODAY'S ER VISIT  Discharge Medication List as of 11/21/2024 12:26 AM        START taking these medications    Details   levofloxacin (LEVAQUIN) 500 MG tablet Take 1 tablet (500 mg) by mouth daily  for 10 days., Disp-10 tablet, R-0, E-Prescribe      !! oxyCODONE (ROXICODONE) 5 MG tablet Take 1 tablet (5 mg) by mouth every 6 hours as needed for pain., Disp-4 tablet, R-0, E-Prescribe       !! - Potential duplicate medications found. Please discuss with provider.             =================================================================    HPI    Patient information was obtained from: the patient     Use of : N/A         Gustavo Phillips is a 69 year old male with a pertinent history of mixed hyperlipidemia, class 1 obesity, nicotine dependence, and tobacco use disorder who presents to this ED by private car for evaluation of groin swelling.     The patient reports while sitting down at 8:00 PM today, he noticed some left testicle pain. He went to use the bathroom and noticed his was a little swollen. States the pain is not excruciating but is constant. The pain is not worse with lying down or walking. Denies abdominal pain. No dysuria or hematuria. Notes he was painting the ceiling 4-5 days ago and wearing tight pants.     History of prostate cancer with a full prostatectomy and stent placement. No history of kidney problems. No known allergies to antibiotics. He is on statin. Not on hormone blockers.     The patient denies fever, back pain, flank pain, and any other complaints at this time.       REVIEW OF SYSTEMS   Refer to the [facility]    PAST MEDICAL HISTORY:  History reviewed. No pertinent past medical history.    PAST SURGICAL HISTORY:  History reviewed. No pertinent surgical history.        CURRENT MEDICATIONS:    [START ON 11/22/2024] levofloxacin (LEVAQUIN) 500 MG tablet  oxyCODONE (ROXICODONE) 5 MG tablet  oxyCODONE (ROXICODONE) 5 MG tablet        ALLERGIES:  No Known Allergies    FAMILY HISTORY:  History reviewed. No pertinent family history.    SOCIAL HISTORY:   Social History     Socioeconomic History    Marital status:      Social Drivers of Health     Financial Resource Strain:  Low Risk  (3/14/2022)    Received from [facility]    Overall Financial Resource Strain (CARDIA)     Difficulty of Paying Living Expenses: Not very hard   Food Insecurity: No Food Insecurity (3/14/2022)    Received from [facility]    Hunger Vital Sign     Worried About Running Out of Food in the Last Year: Never true     Ran Out of Food in the Last Year: Never true   Transportation Needs: No Transportation Needs (3/14/2022)    Received from [facility]    PRAPARE - Transportation     Lack of Transportation (Medical): No     Lack of Transportation (Non-Medical): No   Physical Activity: Insufficiently Active (3/14/2022)    Received from [facility]    Exercise Vital Sign     Days of Exercise per Week: 3 days     Minutes of Exercise per Session: 30 min   Stress: No Stress Concern Present (3/14/2022)    Received from [facility]    [employer] - Occupational Stress Questionnaire     Feeling of Stress : Only a little   Social Connections: Moderately Isolated (3/14/2022)    Received from [facility]    Social Connection and Isolation Panel [NHANES]     Frequency of Communication with Friends and Family: Once a week     Frequency of Social Gatherings with Friends and Family: Once a week     Attends Restoration Services: 1 to 4 times per year     Active Member of Clubs or Organizations: No     Attends Club or Organization Meetings: Patient declined     Marital Status:    Interpersonal Safety: Not At Risk (3/14/2022)    Received from [facility]    Humiliation, Afraid, Rape, and Kick questionnaire     Fear of Current or Ex-Partner: No     Emotionally Abused: No     Physically Abused: No     Sexually Abused: No   Housing Stability: Unknown (3/14/2022)    Received from [facility]    Housing Stability Vital Sign     Unable to Pay for Housing in the Last Year: Patient refused     Number of Places Lived in the Last Year: 1     Unstable Housing in the Last Year: No       VITALS:  BP (!) 159/80   " Pulse 58   Temp 97.3  F (36.3  C) (Temporal)   Resp 18   Ht 1.854 m (6' 1\")   Wt 101.2 kg (223 lb)   SpO2 98%   BMI 29.42 kg/m      PHYSICAL EXAM    Constitutional: Well developed, Well nourished, NAD,   HENT: Normocephalic, Atraumatic,  mucous membranes moist,   Neck- trachea midline, No stridor.    Eyes:EOMI, Conjunctiva normal, No discharge.   Respiratory: Normal breath sounds, No respiratory distress, No wheezing.    Cardiovascular: Normal heart rate, Regular rhythm,  No murmurs,   Abdominal: Soft, No tenderness, No rebound or guarding.   : Testes descended bilaterally. Epididymis on left swollen and tender. No appreciable inguinal hernia.   Bilateral cremasteric reflex  Musculoskeletal: no deformity or malalignment   Integument: Warm, Dry, No erythema,    Neurologic: Alert & oriented x 3   Psychiatric: Affect normal, Cooperative.      LAB:  All pertinent labs reviewed and interpreted.  Results for orders placed or performed during the hospital encounter of 11/21/24   US Testicular & Scrotum w Doppler Ltd    Impression    IMPRESSION:  1.  Enlarged, heterogeneous, hyperemic left epididymis, compatible with epididymitis. Small left hydrocele.    2.  No evidence of testicular torsion.   UA with Microscopic reflex to Culture    Specimen: Urine, Clean Catch   Result Value Ref Range    Color Urine Light Yellow Colorless, Straw, Light Yellow, Yellow    Appearance Urine Clear Clear    Glucose Urine Negative Negative mg/dL    Bilirubin Urine Negative Negative    Ketones Urine Negative Negative mg/dL    Specific Gravity Urine 1.012 1.001 - 1.030    Blood Urine 0.5 mg/dL (A) Negative    pH Urine 7.0 5.0 - 7.0    Protein Albumin Urine Negative Negative mg/dL    Urobilinogen Urine <2.0 <2.0 mg/dL    Nitrite Urine Negative Negative    Leukocyte Esterase Urine Negative Negative    Mucus Urine Present (A) None Seen /LPF    RBC Urine 37 (H) <=2 /HPF    WBC Urine 1 <=5 /HPF       RADIOLOGY:  Reviewed all pertinent " imaging. Please see official radiology report.  US Testicular & Scrotum w Doppler Ltd   Final Result   IMPRESSION:   1.  Enlarged, heterogeneous, hyperemic left epididymis, compatible with epididymitis. Small left hydrocele.      2.  No evidence of testicular torsion.            [facility] Documentation:   CMS Diagnoses:              I, Miloabkirit Johnston, am serving as a scribe to document services personally performed by Jordan Miguel MD based on my observation and the provider's statements to me. I, Jordan Miguel MD, attest that Caryn Johnston is acting in a scribe capacity, has observed my performance of the services and has documented them in accordance with my direction.    Jordan Miguel MD  [facility] EMERGENCY ROOM  [address] [id number] [phone]      Jordan Miguel MD  11/21/24 4967

## 2024-11-21 NOTE — ED NOTES
Patient being discharged from the lobby prior to an RN assessment being done.  This Patient was seen and assessed by a provider only.  AVS Reviewed with patient and he was comfortable with plan.

## 2024-11-21 NOTE — DISCHARGE INSTRUCTIONS
Your workup in the emergency room today shows you have epididymitis on the left side which could cause pain and swelling of the testicle.  We treat this with antibiotics you been prescribed a medication called levofloxacin which you actually have to take 1 time a day for the next 10 days.  Would also recommend Tylenol and ibuprofen as needed for pain and you been given a short prescription of oxycodone to help with breakthrough pain.  Your symptoms will likely persist for the next few days at least while the antibiotic start to work but if you have persistent symptoms beyond a week to 10 days I would recommend close follow-up with your primary care doctor or urologist.  If you begin to develop symptoms of fevers, chills, significant abdominal or flank pain or recurrent vomiting and inability to tolerate the oral medication you should return to the emergency department for repeat evaluation as this could indicate a worsening infection.